# Patient Record
(demographics unavailable — no encounter records)

---

## 2025-03-25 NOTE — PHYSICAL EXAM
[Normal Breath Sounds] : Normal breath sounds [Normal Heart Sounds] : normal heart sounds [2+] : left 2+ [No Rash or Lesion] : No rash or lesion [Alert] : alert [Ankle Swelling (On Exam)] : not present [Varicose Veins Of Lower Extremities] : not present [] : not present [Skin Ulcer] : no ulcer [de-identified] : Appears well, no acute distress noted [de-identified] : No palpable cords. [de-identified] : Intact

## 2025-03-25 NOTE — ASSESSMENT
[FreeTextEntry1] : 76-year-old female with intermittent bilateral lower extremity discomfort and edema.  Pedal pulses are intact bilaterally.  Based on clinical examination, there is no evidence of significant arterial insufficiency at this time.  In the office today, patient underwent duplex which shows no evidence of deep vein thrombosis, superficial phlebitis, or venous insufficiency in either of the lower extremities.  Recommend follow-up care.  Patient with episodes toe discoloration.  Given the patient has strongly palpable pedal pulses, suspect Raynaud's or small vessel disease.  No rest pain or tissue loss. Recommend rheumatology consult.  No vascular intervention is required at this time.  Patient to follow-up as needed.

## 2025-03-25 NOTE — HISTORY OF PRESENT ILLNESS
[FreeTextEntry1] : Patient is a 76-year-old female with history significant for left bundle branch block and hypertension who presents the office today for evaluation of bilateral lower extremity discomfort.  Patient reports bilateral lower extremity discomfort is associated with swelling for the past year.  Patient also reports episodes of toe discoloration.  Denies fever or chills.  Denies chest pain or shortness of breath.  Denies rest pain or claudication symptoms. Denies tissue loss or bleeding varicosities.  No history of deep vein thrombosis or pulmonary embolism.  No history of smoking.

## 2025-07-21 NOTE — PHYSICAL EXAM
[General Appearance - Alert] : alert [General Appearance - In No Acute Distress] : in no acute distress [Respiration, Rhythm And Depth] : normal respiratory rhythm and effort [Musculoskeletal - Swelling] : no joint swelling seen [FreeTextEntry1] :  unable to keep legs against gravity because of pain [Impaired Insight] : insight and judgment were intact

## 2025-07-21 NOTE — ASSESSMENT
[FreeTextEntry1] : # Referred by vascular for skin discoloration of toes -endorses onset around 5 years ago -dark discoloration of toes when cold, remains somewhat discolored chronically. not observed with hands -no rashes. no oral or nasal ulcers. + GERD. no skin tightening. no history of lung or renal disease -per vascular evaluation:  no evidence of deep vein thrombosis, superficial phlebitis, or venous insufficiency in either of the lower extremities. [] given somewhat recent occurrence, will send serology today to r/o secondary causes  # Bilateral hand pain  morning stiffness for few hours no overt swelling, occasional trigger finger no synovitis on exam [] will check serology, inflammatory markers and X-rays   # Chronic low back pain,  radiates to bilateral thighs and legs, unstable gait. associated numbness of legs one episode of incontinence few months ago [] obtain X-rays of L-spine and hips. then will need MRI [] referred today to PT and spine center [] advised to seek care immediately if red flag symptoms  # Chronic knee pain, OA  was told "bone on bone" and needs replacement by ortho in the past PT and pain control  will call with results

## 2025-07-21 NOTE — HISTORY OF PRESENT ILLNESS
[FreeTextEntry1] : Referred by vascular for concern for Raynaud's  -onset around few years ago  -reports dark discoloration of toes when cold, some pain associated it with it. returns back to normal. not seen with hands -no rashes. no oral or nasal ulcers. + GERD. no skin tightening. no history of lung or renal disease -low back pain for few years radiates to bilateral thighs and legs, unstable gait. associated numbness of legs one episode of incontinence few months ago -bilateral knee pain, was told by ortho she needs replacement surgery -bilateral hand pain, morning stiffness for few hours no overt swelling, occasional trigger finger

## 2025-07-21 NOTE — CONSULT LETTER
[Dear  ___] : Dear  [unfilled], [Please see my note below.] : Please see my note below. [Sincerely,] : Sincerely, [FreeTextEntry3] : Betsey Osman MD , [facility] at [facility] Division of Rheumatology

## 2025-07-21 NOTE — DATA REVIEWED
[FreeTextEntry1] : Labs, imaging and chart notes reviewed today with patient labs done in Feb 2025 by PMD (external) within normal limits seen by spine ortho 2017, cervical radiculopathy, MRI disc disease